# Patient Record
Sex: FEMALE | Race: WHITE | Employment: UNEMPLOYED | URBAN - METROPOLITAN AREA
[De-identification: names, ages, dates, MRNs, and addresses within clinical notes are randomized per-mention and may not be internally consistent; named-entity substitution may affect disease eponyms.]

---

## 2019-07-08 ENCOUNTER — HOSPITAL ENCOUNTER (OUTPATIENT)
Dept: GENERAL RADIOLOGY | Age: 59
Discharge: HOME | End: 2019-07-08
Payer: COMMERCIAL

## 2019-07-08 ENCOUNTER — HOSPITAL ENCOUNTER (OUTPATIENT)
Dept: PREADMISSION TESTING | Age: 59
Discharge: HOME | End: 2019-07-08
Payer: COMMERCIAL

## 2019-07-08 VITALS — HEIGHT: 62 IN | WEIGHT: 145 LBS | BODY MASS INDEX: 26.68 KG/M2

## 2019-07-08 DIAGNOSIS — Z72.0 TOBACCO ABUSE: ICD-10-CM

## 2019-07-08 LAB
ALBUMIN SERPL-MCNC: 4.1 G/DL (ref 3.4–5)
ALBUMIN/GLOB SERPL: 1.3 {RATIO} (ref 0.8–1.7)
ALP SERPL-CCNC: 100 U/L (ref 45–117)
ALT SERPL-CCNC: 54 U/L (ref 13–56)
ANION GAP SERPL CALC-SCNC: 8 MMOL/L (ref 3–18)
APTT PPP: 29.4 SEC (ref 23–36.4)
AST SERPL-CCNC: 38 U/L (ref 15–37)
ATRIAL RATE: 85 BPM
BACTERIA SPEC CULT: ABNORMAL
BILIRUB SERPL-MCNC: 0.3 MG/DL (ref 0.2–1)
BUN SERPL-MCNC: 11 MG/DL (ref 7–18)
BUN/CREAT SERPL: 16 (ref 12–20)
CALCIUM SERPL-MCNC: 9.1 MG/DL (ref 8.5–10.1)
CALCULATED P AXIS, ECG09: 56 DEGREES
CALCULATED R AXIS, ECG10: 15 DEGREES
CALCULATED T AXIS, ECG11: 43 DEGREES
CHLORIDE SERPL-SCNC: 98 MMOL/L (ref 100–108)
CO2 SERPL-SCNC: 27 MMOL/L (ref 21–32)
CREAT SERPL-MCNC: 0.7 MG/DL (ref 0.6–1.3)
DIAGNOSIS, 93000: NORMAL
ERYTHROCYTE [DISTWIDTH] IN BLOOD BY AUTOMATED COUNT: 13.4 % (ref 11.6–14.5)
GLOBULIN SER CALC-MCNC: 3.1 G/DL (ref 2–4)
GLUCOSE SERPL-MCNC: 102 MG/DL (ref 74–99)
HCT VFR BLD AUTO: 39.8 % (ref 35–45)
HGB BLD-MCNC: 13.4 G/DL (ref 12–16)
INR PPP: 0.9 (ref 0.8–1.2)
MCH RBC QN AUTO: 32.5 PG (ref 24–34)
MCHC RBC AUTO-ENTMCNC: 33.7 G/DL (ref 31–37)
MCV RBC AUTO: 96.6 FL (ref 74–97)
P-R INTERVAL, ECG05: 164 MS
PLATELET # BLD AUTO: 301 K/UL (ref 135–420)
PMV BLD AUTO: 9.3 FL (ref 9.2–11.8)
POTASSIUM SERPL-SCNC: 4.2 MMOL/L (ref 3.5–5.5)
PROT SERPL-MCNC: 7.2 G/DL (ref 6.4–8.2)
PROTHROMBIN TIME: 12.3 SEC (ref 11.5–15.2)
Q-T INTERVAL, ECG07: 376 MS
QRS DURATION, ECG06: 90 MS
QTC CALCULATION (BEZET), ECG08: 447 MS
RBC # BLD AUTO: 4.12 M/UL (ref 4.2–5.3)
SERVICE CMNT-IMP: ABNORMAL
SODIUM SERPL-SCNC: 133 MMOL/L (ref 136–145)
VENTRICULAR RATE, ECG03: 85 BPM
WBC # BLD AUTO: 6.2 K/UL (ref 4.6–13.2)

## 2019-07-08 PROCEDURE — 80053 COMPREHEN METABOLIC PANEL: CPT

## 2019-07-08 PROCEDURE — 85027 COMPLETE CBC AUTOMATED: CPT

## 2019-07-08 PROCEDURE — 87641 MR-STAPH DNA AMP PROBE: CPT

## 2019-07-08 PROCEDURE — 85610 PROTHROMBIN TIME: CPT

## 2019-07-08 PROCEDURE — 93005 ELECTROCARDIOGRAM TRACING: CPT

## 2019-07-08 PROCEDURE — 85730 THROMBOPLASTIN TIME PARTIAL: CPT

## 2019-07-08 PROCEDURE — 71046 X-RAY EXAM CHEST 2 VIEWS: CPT

## 2019-07-08 RX ORDER — MINERAL OIL
180 ENEMA (ML) RECTAL
COMMUNITY

## 2019-07-08 RX ORDER — OXYCODONE AND ACETAMINOPHEN 5; 325 MG/1; MG/1
1 TABLET ORAL 3 TIMES DAILY
Status: ON HOLD | COMMUNITY
End: 2019-07-29 | Stop reason: SDUPTHER

## 2019-07-08 RX ORDER — ZOLPIDEM TARTRATE 10 MG/1
10 TABLET ORAL
COMMUNITY

## 2019-07-08 RX ORDER — ROPINIROLE 0.5 MG/1
TABLET, FILM COATED ORAL
COMMUNITY

## 2019-07-08 RX ORDER — GABAPENTIN 300 MG/1
300 CAPSULE ORAL 3 TIMES DAILY
COMMUNITY

## 2019-07-08 RX ORDER — CHOLECALCIFEROL TAB 125 MCG (5000 UNIT) 125 MCG
TAB ORAL DAILY
COMMUNITY

## 2019-07-08 RX ORDER — CYCLOBENZAPRINE HCL 10 MG
TABLET ORAL
COMMUNITY

## 2019-07-08 RX ORDER — SODIUM CHLORIDE, SODIUM LACTATE, POTASSIUM CHLORIDE, CALCIUM CHLORIDE 600; 310; 30; 20 MG/100ML; MG/100ML; MG/100ML; MG/100ML
125 INJECTION, SOLUTION INTRAVENOUS CONTINUOUS
Status: CANCELLED | OUTPATIENT
Start: 2019-07-08

## 2019-07-08 RX ORDER — HYDROXYCHLOROQUINE SULFATE 200 MG/1
200 TABLET, FILM COATED ORAL 2 TIMES DAILY
COMMUNITY
End: 2019-07-29

## 2019-07-08 RX ORDER — LEVOTHYROXINE SODIUM 100 UG/1
50 TABLET ORAL
COMMUNITY

## 2019-07-08 RX ORDER — TRAMADOL HYDROCHLORIDE 200 MG/1
1 TABLET, FILM COATED, EXTENDED RELEASE ORAL DAILY
COMMUNITY

## 2019-07-08 RX ORDER — VENLAFAXINE HYDROCHLORIDE 225 MG/1
1 TABLET, EXTENDED RELEASE ORAL DAILY
COMMUNITY

## 2019-07-09 RX ORDER — CEFAZOLIN SODIUM/WATER 2 G/20 ML
2 SYRINGE (ML) INTRAVENOUS ONCE
Status: DISCONTINUED | OUTPATIENT
Start: 2019-07-09 | End: 2019-07-09

## 2019-07-26 ENCOUNTER — ANESTHESIA EVENT (OUTPATIENT)
Dept: SURGERY | Age: 59
End: 2019-07-26
Payer: COMMERCIAL

## 2019-07-28 PROBLEM — M54.9 BACK PAIN: Chronic | Status: ACTIVE | Noted: 2019-07-28

## 2019-07-28 PROBLEM — G89.4 CHRONIC PAIN SYNDROME: Chronic | Status: ACTIVE | Noted: 2019-07-28

## 2019-07-28 NOTE — H&P
History and Physical        Patient: Yesenia Petit               Sex: female          DOA: (Not on file)         YOB: 1960      Age:  61 y.o.        LOS:  LOS: 0 days        HPI:     Yesenia Petit is a 61 y.o. female who has a history of back pain. Their pain is typically across the lower back and is chronic in nature. She denies numbness/tingling in the same distribution of their pain. She denies weakness in the bilateral lower extremity. Their pain is worse with ambulation and better at rest. She has failed conservative care including anti-inflammatories, analgesics, physical therapy and injections. Their pain affects their activities of daily living and would like to move forward with surgical intervention. Past Medical History:   Diagnosis Date    Arthritis     Chronic pain     back pain    Hypertension 2019    Psychiatric disorder     depression    Thyroid disease        Past Surgical History:   Procedure Laterality Date    HX ANKLE FRACTURE TX Left     HX APPENDECTOMY  2007    HX GYN  1985    cone biopsy    HX HYSTERECTOMY  1993    partial    HX ORTHOPAEDIC  2015    fusion of spin L3-L5    HX PARTIAL THYROIDECTOMY      HX TONSILLECTOMY  1968       No family history on file. Social History     Socioeconomic History    Marital status:      Spouse name: Not on file    Number of children: Not on file    Years of education: Not on file    Highest education level: Not on file   Tobacco Use    Smoking status: Current Every Day Smoker     Packs/day: 0.50    Smokeless tobacco: Never Used   Substance and Sexual Activity    Alcohol use: Yes     Alcohol/week: 1.0 standard drinks     Types: 1 Shots of liquor per week    Drug use: Not Currently       Prior to Admission medications    Medication Sig Start Date End Date Taking? Authorizing Provider   levothyroxine (SYNTHROID) 100 mcg tablet Take 50 mcg by mouth Daily (before breakfast).     Provider, Historical rOPINIRole (REQUIP) 0.5 mg tablet Take  by mouth nightly. Provider, Historical   venlafaxine 225 mg tr24 Take 1 Tab by mouth daily. Provider, Historical   fexofenadine (ALLEGRA) 180 mg tablet Take 180 mg by mouth. Provider, Historical   zolpidem (AMBIEN) 10 mg tablet Take 10 mg by mouth nightly as needed for Sleep. Provider, Historical   traMADol 200 mg TM24 Take 1 Tab by mouth daily. Provider, Historical   oxyCODONE-acetaminophen (PERCOCET) 5-325 mg per tablet Take 1 Tab by mouth three (3) times daily. Provider, Historical   gabapentin (NEURONTIN) 300 mg capsule Take 300 mg by mouth three (3) times daily. Provider, Historical   cyclobenzaprine (FLEXERIL) 10 mg tablet Take  by mouth three (3) times daily as needed for Muscle Spasm(s). Provider, Historical   hydroxychloroquine (PLAQUENIL) 200 mg tablet Take 200 mg by mouth two (2) times a day. Provider, Historical   Omega-3 Fatty Acids (FISH OIL) 500 mg cap Take 1 Cap by mouth. Provider, Historical   cholecalciferol, VITAMIN D3, (VITAMIN D3) 5,000 unit tab tablet Take  by mouth daily. Provider, Historical       No Known Allergies    Review of Systems  A comprehensive review of systems was negative except for that written in the History of Present Illness. Physical Exam:      There were no vitals taken for this visit. Physical Exam:  General: Alert and Oriented X 3  Lungs: Clear to ausculation bilaterally  Cardiovascular: Regular Rate and Rhythm, without murmur  Abdomen: Soft, nontender with positive bowel sounds in all four quadrants  Gential/Rectal: deferred  Musculoskeletal: The paitent has full range of motion of the lumbar spine in flexion, extension and side bending bilaterally. The patient has pain with flexion. There is not pain with internal and external rotation of the bilaterally hip. The patient is tender across the left paralumbar, right paralumbar muscles.   The patient is neurologically intact in the lower extremities. There is a Negative straight leg raise. His pulses are 2+. Calves are nontender. Labs Reviewed: All lab results for the last 24 hours reviewed. Assessment/Plan     Principal Problem:    Chronic pain syndrome (7/28/2019)    Active Problems:    Back pain (7/28/2019)        At this time, we discussed that we will move forward with permanent placement of a spinal cord stimulator, as she responded so well to the trial. We discussed that we would move forward with a T10 Laminotomy with placement of the paddle in the T8-T9 disc space centrally. We will place the battery in the gluteal area. The risks versus the benefits as well as the alternatives were fully explained to the patient.

## 2019-07-29 ENCOUNTER — APPOINTMENT (OUTPATIENT)
Dept: GENERAL RADIOLOGY | Age: 59
End: 2019-07-29
Attending: ORTHOPAEDIC SURGERY
Payer: COMMERCIAL

## 2019-07-29 ENCOUNTER — ANESTHESIA (OUTPATIENT)
Dept: SURGERY | Age: 59
End: 2019-07-29
Payer: COMMERCIAL

## 2019-07-29 ENCOUNTER — HOSPITAL ENCOUNTER (OUTPATIENT)
Age: 59
Setting detail: OUTPATIENT SURGERY
Discharge: HOME | End: 2019-07-29
Attending: ORTHOPAEDIC SURGERY | Admitting: ORTHOPAEDIC SURGERY
Payer: COMMERCIAL

## 2019-07-29 VITALS
HEART RATE: 104 BPM | DIASTOLIC BLOOD PRESSURE: 88 MMHG | RESPIRATION RATE: 12 BRPM | HEIGHT: 62 IN | WEIGHT: 143.25 LBS | TEMPERATURE: 97.9 F | SYSTOLIC BLOOD PRESSURE: 128 MMHG | BODY MASS INDEX: 26.36 KG/M2 | OXYGEN SATURATION: 95 %

## 2019-07-29 DIAGNOSIS — G89.4 CHRONIC PAIN SYNDROME: Primary | Chronic | ICD-10-CM

## 2019-07-29 PROCEDURE — 77030014076 HC TOOL TUNNEL BSC -C: Performed by: ORTHOPAEDIC SURGERY

## 2019-07-29 PROCEDURE — 76010000149 HC OR TIME 1 TO 1.5 HR: Performed by: ORTHOPAEDIC SURGERY

## 2019-07-29 PROCEDURE — 74011250636 HC RX REV CODE- 250/636

## 2019-07-29 PROCEDURE — 77030008683 HC TU ET CUF COVD -A: Performed by: ANESTHESIOLOGY

## 2019-07-29 PROCEDURE — 77030033138 HC SUT PGA STRATFX J&J -B: Performed by: ORTHOPAEDIC SURGERY

## 2019-07-29 PROCEDURE — 77030002912 HC SUT ETHBND J&J -A: Performed by: ORTHOPAEDIC SURGERY

## 2019-07-29 PROCEDURE — 74011000250 HC RX REV CODE- 250: Performed by: ORTHOPAEDIC SURGERY

## 2019-07-29 PROCEDURE — C1713 ANCHOR/SCREW BN/BN,TIS/BN: HCPCS | Performed by: ORTHOPAEDIC SURGERY

## 2019-07-29 PROCEDURE — 74011250636 HC RX REV CODE- 250/636: Performed by: ORTHOPAEDIC SURGERY

## 2019-07-29 PROCEDURE — 77030032490 HC SLV COMPR SCD KNE COVD -B: Performed by: ORTHOPAEDIC SURGERY

## 2019-07-29 PROCEDURE — 77030013708 HC HNDPC SUC IRR PULS STRY –B: Performed by: ORTHOPAEDIC SURGERY

## 2019-07-29 PROCEDURE — 77030020269 HC MISC IMPL: Performed by: ORTHOPAEDIC SURGERY

## 2019-07-29 PROCEDURE — 77030018673: Performed by: ORTHOPAEDIC SURGERY

## 2019-07-29 PROCEDURE — C1820 GENERATOR NEURO RECHG BAT SY: HCPCS | Performed by: ORTHOPAEDIC SURGERY

## 2019-07-29 PROCEDURE — 77030012716 HC ELEV PASS NEURO BSC -B: Performed by: ORTHOPAEDIC SURGERY

## 2019-07-29 PROCEDURE — 76210000000 HC OR PH I REC 2 TO 2.5 HR: Performed by: ORTHOPAEDIC SURGERY

## 2019-07-29 PROCEDURE — C1787 PATIENT PROGR, NEUROSTIM: HCPCS | Performed by: ORTHOPAEDIC SURGERY

## 2019-07-29 PROCEDURE — 77030018836 HC SOL IRR NACL ICUM -A: Performed by: ORTHOPAEDIC SURGERY

## 2019-07-29 PROCEDURE — 77030018390 HC SPNG HEMSTAT2 J&J -B: Performed by: ORTHOPAEDIC SURGERY

## 2019-07-29 PROCEDURE — 77030010507 HC ADH SKN DERMBND J&J -B: Performed by: ORTHOPAEDIC SURGERY

## 2019-07-29 PROCEDURE — 77030003029 HC SUT VCRL J&J -B: Performed by: ORTHOPAEDIC SURGERY

## 2019-07-29 PROCEDURE — 77030006643: Performed by: ANESTHESIOLOGY

## 2019-07-29 PROCEDURE — 74011250636 HC RX REV CODE- 250/636: Performed by: ANESTHESIOLOGY

## 2019-07-29 PROCEDURE — 76210000026 HC REC RM PH II 1 TO 1.5 HR: Performed by: ORTHOPAEDIC SURGERY

## 2019-07-29 PROCEDURE — 77030037875 HC DRSG MEPILEX <16IN BORD MOLN -A: Performed by: ORTHOPAEDIC SURGERY

## 2019-07-29 PROCEDURE — 77030020782 HC GWN BAIR PAWS FLX 3M -B: Performed by: ORTHOPAEDIC SURGERY

## 2019-07-29 PROCEDURE — 76060000033 HC ANESTHESIA 1 TO 1.5 HR: Performed by: ORTHOPAEDIC SURGERY

## 2019-07-29 PROCEDURE — 77010033678 HC OXYGEN DAILY

## 2019-07-29 PROCEDURE — 74011000250 HC RX REV CODE- 250

## 2019-07-29 PROCEDURE — C1778 LEAD, NEUROSTIMULATOR: HCPCS | Performed by: ORTHOPAEDIC SURGERY

## 2019-07-29 PROCEDURE — 77030020274 HC MISC IMPL ORTHOPEDIC: Performed by: ORTHOPAEDIC SURGERY

## 2019-07-29 RX ORDER — NEOSTIGMINE METHYLSULFATE 5 MG/5 ML
SYRINGE (ML) INTRAVENOUS AS NEEDED
Status: DISCONTINUED | OUTPATIENT
Start: 2019-07-29 | End: 2019-07-29 | Stop reason: HOSPADM

## 2019-07-29 RX ORDER — OXYCODONE AND ACETAMINOPHEN 5; 325 MG/1; MG/1
1 TABLET ORAL AS NEEDED
Status: DISCONTINUED | OUTPATIENT
Start: 2019-07-29 | End: 2019-07-29 | Stop reason: HOSPADM

## 2019-07-29 RX ORDER — GLYCOPYRROLATE 0.2 MG/ML
INJECTION INTRAMUSCULAR; INTRAVENOUS AS NEEDED
Status: DISCONTINUED | OUTPATIENT
Start: 2019-07-29 | End: 2019-07-29 | Stop reason: HOSPADM

## 2019-07-29 RX ORDER — DIPHENHYDRAMINE HYDROCHLORIDE 50 MG/ML
12.5 INJECTION, SOLUTION INTRAMUSCULAR; INTRAVENOUS
Status: DISCONTINUED | OUTPATIENT
Start: 2019-07-29 | End: 2019-07-29 | Stop reason: HOSPADM

## 2019-07-29 RX ORDER — DEXAMETHASONE SODIUM PHOSPHATE 4 MG/ML
INJECTION, SOLUTION INTRA-ARTICULAR; INTRALESIONAL; INTRAMUSCULAR; INTRAVENOUS; SOFT TISSUE AS NEEDED
Status: DISCONTINUED | OUTPATIENT
Start: 2019-07-29 | End: 2019-07-29 | Stop reason: HOSPADM

## 2019-07-29 RX ORDER — CIPROFLOXACIN 750 MG/1
750 TABLET, FILM COATED ORAL EVERY 12 HOURS
Qty: 14 TAB | Refills: 0 | Status: SHIPPED | OUTPATIENT
Start: 2019-07-29 | End: 2019-08-05

## 2019-07-29 RX ORDER — PROPOFOL 10 MG/ML
INJECTION, EMULSION INTRAVENOUS AS NEEDED
Status: DISCONTINUED | OUTPATIENT
Start: 2019-07-29 | End: 2019-07-29 | Stop reason: HOSPADM

## 2019-07-29 RX ORDER — SODIUM CHLORIDE, SODIUM LACTATE, POTASSIUM CHLORIDE, CALCIUM CHLORIDE 600; 310; 30; 20 MG/100ML; MG/100ML; MG/100ML; MG/100ML
1000 INJECTION, SOLUTION INTRAVENOUS CONTINUOUS
Status: DISCONTINUED | OUTPATIENT
Start: 2019-07-29 | End: 2019-07-29 | Stop reason: HOSPADM

## 2019-07-29 RX ORDER — ROCURONIUM BROMIDE 10 MG/ML
INJECTION, SOLUTION INTRAVENOUS AS NEEDED
Status: DISCONTINUED | OUTPATIENT
Start: 2019-07-29 | End: 2019-07-29 | Stop reason: HOSPADM

## 2019-07-29 RX ORDER — FENTANYL CITRATE 50 UG/ML
25 INJECTION, SOLUTION INTRAMUSCULAR; INTRAVENOUS AS NEEDED
Status: DISCONTINUED | OUTPATIENT
Start: 2019-07-29 | End: 2019-07-29 | Stop reason: HOSPADM

## 2019-07-29 RX ORDER — OXYCODONE AND ACETAMINOPHEN 5; 325 MG/1; MG/1
1 TABLET ORAL
Qty: 60 TAB | Refills: 0 | Status: SHIPPED | OUTPATIENT
Start: 2019-07-29 | End: 2019-08-12

## 2019-07-29 RX ORDER — MIDAZOLAM HYDROCHLORIDE 1 MG/ML
INJECTION, SOLUTION INTRAMUSCULAR; INTRAVENOUS AS NEEDED
Status: DISCONTINUED | OUTPATIENT
Start: 2019-07-29 | End: 2019-07-29 | Stop reason: HOSPADM

## 2019-07-29 RX ORDER — SODIUM CHLORIDE, SODIUM LACTATE, POTASSIUM CHLORIDE, CALCIUM CHLORIDE 600; 310; 30; 20 MG/100ML; MG/100ML; MG/100ML; MG/100ML
125 INJECTION, SOLUTION INTRAVENOUS CONTINUOUS
Status: DISCONTINUED | OUTPATIENT
Start: 2019-07-29 | End: 2019-07-29 | Stop reason: HOSPADM

## 2019-07-29 RX ORDER — KETAMINE HYDROCHLORIDE 10 MG/ML
INJECTION, SOLUTION INTRAMUSCULAR; INTRAVENOUS AS NEEDED
Status: DISCONTINUED | OUTPATIENT
Start: 2019-07-29 | End: 2019-07-29 | Stop reason: HOSPADM

## 2019-07-29 RX ORDER — ONDANSETRON 2 MG/ML
INJECTION INTRAMUSCULAR; INTRAVENOUS AS NEEDED
Status: DISCONTINUED | OUTPATIENT
Start: 2019-07-29 | End: 2019-07-29 | Stop reason: HOSPADM

## 2019-07-29 RX ORDER — NALOXONE HYDROCHLORIDE 0.4 MG/ML
0.2 INJECTION, SOLUTION INTRAMUSCULAR; INTRAVENOUS; SUBCUTANEOUS AS NEEDED
Status: DISCONTINUED | OUTPATIENT
Start: 2019-07-29 | End: 2019-07-29 | Stop reason: HOSPADM

## 2019-07-29 RX ORDER — HYDROMORPHONE HYDROCHLORIDE 2 MG/ML
0.5 INJECTION, SOLUTION INTRAMUSCULAR; INTRAVENOUS; SUBCUTANEOUS
Status: COMPLETED | OUTPATIENT
Start: 2019-07-29 | End: 2019-07-29

## 2019-07-29 RX ORDER — MELOXICAM 7.5 MG/1
7.5 TABLET ORAL DAILY
COMMUNITY
End: 2019-07-29

## 2019-07-29 RX ORDER — FLUMAZENIL 0.1 MG/ML
0.2 INJECTION INTRAVENOUS
Status: DISCONTINUED | OUTPATIENT
Start: 2019-07-29 | End: 2019-07-29 | Stop reason: HOSPADM

## 2019-07-29 RX ORDER — OXYCODONE AND ACETAMINOPHEN 5; 325 MG/1; MG/1
1 TABLET ORAL
Status: DISCONTINUED | OUTPATIENT
Start: 2019-07-29 | End: 2019-07-29 | Stop reason: HOSPADM

## 2019-07-29 RX ORDER — LISINOPRIL 20 MG/1
20 TABLET ORAL DAILY
COMMUNITY

## 2019-07-29 RX ORDER — ALBUTEROL SULFATE 0.83 MG/ML
2.5 SOLUTION RESPIRATORY (INHALATION) AS NEEDED
Status: DISCONTINUED | OUTPATIENT
Start: 2019-07-29 | End: 2019-07-29 | Stop reason: HOSPADM

## 2019-07-29 RX ORDER — FENTANYL CITRATE 50 UG/ML
INJECTION, SOLUTION INTRAMUSCULAR; INTRAVENOUS AS NEEDED
Status: DISCONTINUED | OUTPATIENT
Start: 2019-07-29 | End: 2019-07-29 | Stop reason: HOSPADM

## 2019-07-29 RX ADMIN — GLYCOPYRROLATE 0.2 MG: 0.2 INJECTION INTRAMUSCULAR; INTRAVENOUS at 09:58

## 2019-07-29 RX ADMIN — HYDROMORPHONE HYDROCHLORIDE 0.5 MG: 2 INJECTION INTRAMUSCULAR; INTRAVENOUS; SUBCUTANEOUS at 13:36

## 2019-07-29 RX ADMIN — KETAMINE HYDROCHLORIDE 30 MG: 10 INJECTION, SOLUTION INTRAMUSCULAR; INTRAVENOUS at 10:20

## 2019-07-29 RX ADMIN — HYDROMORPHONE HYDROCHLORIDE 0.5 MG: 2 INJECTION INTRAMUSCULAR; INTRAVENOUS; SUBCUTANEOUS at 13:26

## 2019-07-29 RX ADMIN — HYDROMORPHONE HYDROCHLORIDE 0.5 MG: 2 INJECTION INTRAMUSCULAR; INTRAVENOUS; SUBCUTANEOUS at 11:53

## 2019-07-29 RX ADMIN — GLYCOPYRROLATE 0.6 MG: 0.2 INJECTION INTRAMUSCULAR; INTRAVENOUS at 11:03

## 2019-07-29 RX ADMIN — FENTANYL CITRATE 100 MCG: 50 INJECTION, SOLUTION INTRAMUSCULAR; INTRAVENOUS at 10:03

## 2019-07-29 RX ADMIN — PROPOFOL 130 MG: 10 INJECTION, EMULSION INTRAVENOUS at 10:03

## 2019-07-29 RX ADMIN — HYDROMORPHONE HYDROCHLORIDE 0.5 MG: 2 INJECTION INTRAMUSCULAR; INTRAVENOUS; SUBCUTANEOUS at 12:13

## 2019-07-29 RX ADMIN — MIDAZOLAM HYDROCHLORIDE 2 MG: 1 INJECTION, SOLUTION INTRAMUSCULAR; INTRAVENOUS at 09:58

## 2019-07-29 RX ADMIN — DEXAMETHASONE SODIUM PHOSPHATE 4 MG: 4 INJECTION, SOLUTION INTRA-ARTICULAR; INTRALESIONAL; INTRAMUSCULAR; INTRAVENOUS; SOFT TISSUE at 10:23

## 2019-07-29 RX ADMIN — ONDANSETRON 4 MG: 2 INJECTION INTRAMUSCULAR; INTRAVENOUS at 10:53

## 2019-07-29 RX ADMIN — ROCURONIUM BROMIDE 40 MG: 10 INJECTION, SOLUTION INTRAVENOUS at 10:04

## 2019-07-29 RX ADMIN — SODIUM CHLORIDE, SODIUM LACTATE, POTASSIUM CHLORIDE, AND CALCIUM CHLORIDE 125 ML/HR: 600; 310; 30; 20 INJECTION, SOLUTION INTRAVENOUS at 08:01

## 2019-07-29 RX ADMIN — SODIUM CHLORIDE, SODIUM LACTATE, POTASSIUM CHLORIDE, AND CALCIUM CHLORIDE: 600; 310; 30; 20 INJECTION, SOLUTION INTRAVENOUS at 11:12

## 2019-07-29 RX ADMIN — KETAMINE HYDROCHLORIDE 20 MG: 10 INJECTION, SOLUTION INTRAMUSCULAR; INTRAVENOUS at 10:50

## 2019-07-29 RX ADMIN — HYDROMORPHONE HYDROCHLORIDE 0.5 MG: 2 INJECTION INTRAMUSCULAR; INTRAVENOUS; SUBCUTANEOUS at 12:03

## 2019-07-29 RX ADMIN — Medication 3 MG: at 11:03

## 2019-07-29 RX ADMIN — HYDROMORPHONE HYDROCHLORIDE 0.5 MG: 2 INJECTION INTRAMUSCULAR; INTRAVENOUS; SUBCUTANEOUS at 11:43

## 2019-07-29 RX ADMIN — SODIUM CHLORIDE 1000 MG: 900 INJECTION, SOLUTION INTRAVENOUS at 08:28

## 2019-07-29 NOTE — ANESTHESIA POSTPROCEDURE EVALUATION
Post-Anesthesia Evaluation and Assessment    Cardiovascular Function/Vital Signs  Visit Vitals  /58   Pulse 71   Temp 36.3 °C (97.3 °F)   Resp 15   Ht 5' 2\" (1.575 m)   Wt 65 kg (143 lb 4 oz)   SpO2 100%   BMI 26.20 kg/m²       Patient is status post Procedure(s):  THORACIC 10 LAMINOTOMY WITH PLACEMENT OF SPINAL CORD STIMULATOR AND BATTERY WITH C-ARM **CONTACT ISOLATION**. Nausea/Vomiting: Controlled. Postoperative hydration reviewed and adequate. Pain:  Pain Scale 1: Numeric (0 - 10) (07/29/19 1215)  Pain Intensity 1: 4 (07/29/19 1215)   Managed. Neurological Status:   Neuro (WDL): Within Defined Limits (07/29/19 0752)   At baseline. Mental Status and Level of Consciousness: Baseline and appropriate for discharge. Pulmonary Status:   O2 Device: Room air (07/29/19 1205)   Adequate oxygenation and airway patent. Complications related to anesthesia: None    Post-anesthesia assessment completed. No concerns. Patient has met all discharge requirements.     Signed By: Laurita Garcia MD    July 29, 2019

## 2019-07-29 NOTE — PERIOP NOTES
Called into OR 8 and spoke with Yanique Ramey RN regarding when to start the patient's Vancomycin. Yanique Ramey spoke with  who states that he would like the Vancomycin started now.

## 2019-07-29 NOTE — PERIOP NOTES
Reviewed PTA medication list with patient/caregiver and patient/caregiver denies any additional medications. Patient admits to having a responsible adult care for them at home for at least 24 hours after surgery. Patient denies georgiana chewing/swallowing difficulties. Patient encouraged to use Juan Jose paws warming system and informed that using Juan Jose paws to regulate body temperature prior to a procedure has been shown to help reduce the risks of blood clots and infection. Dual skin assessment & fall risk band verification completed with Jyoti Cruz RN.

## 2019-07-29 NOTE — ANESTHESIA PREPROCEDURE EVALUATION
Relevant Problems   No relevant active problems       Anesthetic History   No history of anesthetic complications            Review of Systems / Medical History  Patient summary reviewed, nursing notes reviewed and pertinent labs reviewed    Pulmonary          Smoker         Neuro/Psych         Psychiatric history     Cardiovascular    Hypertension              Exercise tolerance: >4 METS     GI/Hepatic/Renal  Within defined limits              Endo/Other      Hypothyroidism: well controlled  Arthritis     Other Findings              Physical Exam    Airway  Mallampati: II  TM Distance: 4 - 6 cm  Neck ROM: normal range of motion   Mouth opening: Normal     Cardiovascular  Regular rate and rhythm,  S1 and S2 normal,  no murmur, click, rub, or gallop             Dental  No notable dental hx       Pulmonary  Breath sounds clear to auscultation               Abdominal  GI exam deferred       Other Findings            Anesthetic Plan    ASA: 2            Induction: Intravenous  Anesthetic plan and risks discussed with: Patient

## 2019-07-29 NOTE — INTERVAL H&P NOTE
H&P Update:  Laury Robbins was seen and examined. History and physical has been reviewed. The patient has been examined. There have been no significant clinical changes since the completion of the originally dated History and Physical.  Patient identified by surgeon; surgical site was confirmed by patient and surgeon.

## 2019-07-29 NOTE — OP NOTES
OPERATIVE NOTE    Patient: Marcelo Raymond MRN: [id number]  SSN: [id number]    YOB: 1960  Age: 61 y.o. Sex: female      Indications: This is a 61y.o. year-old female who presents with chronic back pain. She was positive for relief from a temporary spinal cord stimulator. The patient was admitted for surgery as conservative measures have failed. Date of Procedure: 7/29/2019     Preoperative Diagnosis: CHRONIC PAIN SYNDROME    Postoperative Diagnosis: CHRONIC PAIN SYNDROME      Procedure: Procedure(s):  THORACIC 10 LAMINOTOMY WITH PLACEMENT OF SPINAL CORD STIMULATOR AND BATTERY WITH C-ARM **CONTACT ISOLATION**    Surgeon: Yunior Ware, and Surgical Assistant:Selma Jones    Assistant: Circ-1: Matilda Young RN  Physician Assistant: Dwayne Christopher PA-C  Radiology Technician: Donald Claudio  Scrskyla Tech-1: Cesar Moura  Surg Asst-1: Dallas Feliz    Anesthesia: @[facility]    Estimated Blood Loss: 25cc    Specimens: * No specimens in log *     Drains: none    Implants:   Implant Name Type Inv. Item Serial No.  Lot No. LRB No. Used Action   SPECTRA WAVEWRITER PULSE GENERATOR   [id number] [employer]  N/A 1 Implanted   ARTISAN MRI LEAD KIT   [id number] [employer]  N/A 1 Implanted       Complications: None; patient tolerated the procedure well. Procedure: The patient was greeted by anesthesia and taken to the operative suite, where the patient underwent general endotracheal anesthesia. The patient was positioned in the prone position on a standard radiolucent Vickey spine table with the Dinh frame. The thoracolumbar spine as well as theright gluteal area was sterilely prepped and draped in standard fashion. Fluoroscopy confirmed the thoracic 10 lamina. A small 1.5-inch incision was made over the posterior spinous processes. The paraspinal musculature was elevated with electrocautery and a Ochoa retractor was placed.   Again, fluoroscopy confirmed the appropriate level and a small laminotomy was preformed over the inferior aspect of the thoracic 10 lamina. Once entrance into the epidural space was confirmed the [facility] [employer]  paddle was slowly slid in the posterior epidural space, centered over the Thoracic 8 - 9 vertebraes as planned preoperatively. Once this was confirmed radiographically it was sutured into place on the remaining lamina and fascia utilizing Ethibond suture. A 2 inch incision was made over the left gluteal area below the belt line. A pocket was created 1 inch in depth. The leads from the spinal cord stimulator were tunneled into the gluteal pocket and connected to the pulse generator battery. The representative for the spinal cord stimulator confirmed that the system was functional and a basic inital programming was accomplished. The pulse generator was subsequently placed into the gluteal pocket. Both incisions were irrigated with 1000ml of sterile saline with bacitracin utilizing pulse lavage. The incision over the gluteal pocket was reapproximated with 2-0 Vicryl in subcutaneous fashion, and running 3-0 Monocryl in subcuticular fashion. A final layer of Dermabolnd skin sealant was placed as well as soft sterile dressing and tape. For the thoracic laminotomy incision the fascia was reapproximated with figure of eight #1 Vicryl suture. The skin edges were reapproximated with 2-0 Vicryl in subcutaneous fashion, and the skin was closed with running 3-0 Monocryl in subcuticular fashion. Layer of Dermabond skin sealant was again placed as well as a layer of dressing and tape. The patient recovered from anesthesia and was transferred to the postanesthesia care unit in stable condition. Final x-rays confirmed excellent position of the spinal cord stimulator, centered over the thoracic 8 - 9 vertebrae as planned.

## (undated) DEVICE — PACK PROCEDURE SURG LAMINECTOMY SPINE CUST

## (undated) DEVICE — REMOTE CONTROL KIT: Brand: FREELINK™

## (undated) DEVICE — SUT ETHBND 1 30IN OS4 GRN --

## (undated) DEVICE — STERILE POLYISOPRENE POWDER-FREE SURGICAL GLOVES: Brand: PROTEXIS

## (undated) DEVICE — SOL IRRIGATION INJ NACL 0.9% 500ML BTL

## (undated) DEVICE — KENDALL SCD EXPRESS SLEEVES, KNEE LENGTH, MEDIUM: Brand: KENDALL SCD

## (undated) DEVICE — CATH IV AUTOGRD ORN 14GA 45MM -- INSYTE-N

## (undated) DEVICE — CHARGING SYSTEM KIT: Brand: PRECISION™

## (undated) DEVICE — HANDPIECE SET WITH FAN SPRAY TIP: Brand: INTERPULSE

## (undated) DEVICE — SINGLE PORT MANIFOLD: Brand: NEPTUNE 2

## (undated) DEVICE — WILSON FRAME STYLE POSITIONING KITS - 	FRAME PAD SLEEVES (SET OF 2) , DRAPE PROTECTOR, BAR PROTECTOR 7" COMFORT FOAM HEADREST, LAMINECTOMY ARM CRADLES: Brand: SOULE MEDICAL

## (undated) DEVICE — POWDER HEMOSTAT GEL 1GR --

## (undated) DEVICE — (D)PREP SKN CHLRAPRP APPL 26ML -- CONVERT TO ITEM 371833

## (undated) DEVICE — STERILE POLYISOPRENE POWDER-FREE SURGICAL GLOVES WITH EMOLLIENT COATING: Brand: PROTEXIS

## (undated) DEVICE — SUTURE STRATAFIX SZ 3-0 L30CM NONABSORBABLE UD L19MM FS-2 SXMP2B408

## (undated) DEVICE — TOOL TUNNELER STRAW LNG 35CM -- SC-4254

## (undated) DEVICE — 3M™ IOBAN™ 2 ANTIMICROBIAL INCISE DRAPE 6650EZ: Brand: IOBAN™ 2

## (undated) DEVICE — SUTURE VCRL + SZ 2-0 L18IN ABSRB VLT CT-2 1/2 CIR TAPERCUT VCP726D

## (undated) DEVICE — ELEVATOR NEUROSTIMULATOR SPNL PASS FOR SPNL CRD STIM SYS

## (undated) DEVICE — SUTURE VCRL + SZ 1 L18IN ABSRB UD L36MM CT-1 1/2 CIR VCP841D

## (undated) DEVICE — REM POLYHESIVE ADULT PATIENT RETURN ELECTRODE: Brand: VALLEYLAB

## (undated) DEVICE — DERMABOND SKIN ADH 0.7ML -- DERMABOND ADVANCED 12/BX

## (undated) DEVICE — SOL INJ L R 1000ML BG --

## (undated) DEVICE — Z DISCONTINUED PER MEDLINE USE 2718072 DRESSING FOAM W5XL12.5CM SIL ADH THN BORDED CNFRM LO PROF